# Patient Record
Sex: MALE
[De-identification: names, ages, dates, MRNs, and addresses within clinical notes are randomized per-mention and may not be internally consistent; named-entity substitution may affect disease eponyms.]

---

## 2017-07-10 PROBLEM — Z00.00 ENCOUNTER FOR PREVENTIVE HEALTH EXAMINATION: Status: ACTIVE | Noted: 2017-07-10

## 2023-02-10 ENCOUNTER — APPOINTMENT (OUTPATIENT)
Dept: SURGERY | Facility: CLINIC | Age: 58
End: 2023-02-10
Payer: COMMERCIAL

## 2023-02-10 VITALS
HEART RATE: 73 BPM | SYSTOLIC BLOOD PRESSURE: 118 MMHG | HEIGHT: 67 IN | OXYGEN SATURATION: 94 % | DIASTOLIC BLOOD PRESSURE: 80 MMHG | TEMPERATURE: 97.1 F | WEIGHT: 250 LBS | BODY MASS INDEX: 39.24 KG/M2

## 2023-02-10 PROCEDURE — 99204 OFFICE O/P NEW MOD 45 MIN: CPT

## 2023-02-10 NOTE — ASSESSMENT
[FreeTextEntry1] : 58M h/o hepatic steatosis, esophageal varices, HTN, TALIA, obesity, now presents with possible RLQ hernia. Unfortunately, the patient did not bring his imaging records for review. I asked him to call back with the report or CD for me to review. May need repeat CT A/P.

## 2023-02-10 NOTE — REVIEW OF SYSTEMS
[Fever] : no fever [Chills] : no chills [Eye Pain] : no eye pain [Red Eyes] : eyes not red [Earache] : no earache [Loss Of Hearing] : no hearing loss [Heart Rate Is Slow] : the heart rate was not slow [Heart Rate Is Fast] : the heart rate was not fast [Chest Pain] : no chest pain [Shortness Of Breath] : no shortness of breath [Wheezing] : no wheezing [Cough] : no cough [Abdominal Pain] : no abdominal pain [Vomiting] : no vomiting [Constipation] : no constipation [Dysuria] : no dysuria [Incontinence] : no incontinence [Joint Swelling] : no joint swelling [Joint Stiffness] : no joint stiffness [Skin Lesions] : no skin lesions [Skin Wound] : no skin wound [Confused] : no confusion [Convulsions] : no convulsions [Dizziness] : no dizziness [Fainting] : no fainting [Suicidal] : not suicidal [Sleep Disturbances] : no sleep disturbances [Anxiety] : no anxiety [Proptosis] : no proptosis [Hot Flashes] : no hot flashes [Easy Bleeding] : no tendency for easy bleeding [Easy Bruising] : no tendency for easy bruising

## 2023-02-10 NOTE — PHYSICAL EXAM
[JVD] : no jugular venous distention  [Normal Breath Sounds] : Normal breath sounds [Normal Heart Sounds] : normal heart sounds [Normal Rate and Rhythm] : normal rate and rhythm [Abdominal Masses] : No abdominal masses [Abdomen Tenderness] : ~T ~M No abdominal tenderness [Enlarged] : enlarged [No Rash or Lesion] : No rash or lesion [Alert] : alert [Oriented to Person] : oriented to person [Oriented to Place] : oriented to place [Oriented to Time] : oriented to time [Calm] : calm [de-identified] : well-appearing, NAD [de-identified] : LAURO [de-identified] : soft, NT, ND, unable to palpate any hernia defects due to habitus. [de-identified] : no CVAT [de-identified] : full ROM, no gross deformities

## 2023-02-10 NOTE — HISTORY OF PRESENT ILLNESS
[de-identified] : 58M h/o HTN, severe TALIA, obesity, esophageal varices (no cirrhosis found on biopsy, just steatosis), now presents for evaluation of RLQ pain and possible hernia. Previous open appendectomy many years ago. No pain in groin. During workup for possible cirrhosis, had an MRI, as per patient, that showed possible hernia defect in RLQ and also small "fatty mass" in the L-sided abd subq tissues.\par No changes in diet tolerance or bowel function. No fevers or chills. No tobacco use. No steroid use.

## 2023-07-24 ENCOUNTER — APPOINTMENT (OUTPATIENT)
Dept: FAMILY MEDICINE | Facility: CLINIC | Age: 58
End: 2023-07-24

## 2025-01-06 ENCOUNTER — RX ONLY (RX ONLY)
Age: 60
End: 2025-01-06

## 2025-01-06 ENCOUNTER — OFFICE (OUTPATIENT)
Dept: URBAN - METROPOLITAN AREA CLINIC 113 | Facility: CLINIC | Age: 60
Setting detail: OPHTHALMOLOGY
End: 2025-01-06
Payer: COMMERCIAL

## 2025-01-06 DIAGNOSIS — H01.001: ICD-10-CM

## 2025-01-06 DIAGNOSIS — H43.22: ICD-10-CM

## 2025-01-06 DIAGNOSIS — H02.834: ICD-10-CM

## 2025-01-06 DIAGNOSIS — H02.831: ICD-10-CM

## 2025-01-06 DIAGNOSIS — H01.004: ICD-10-CM

## 2025-01-06 DIAGNOSIS — H25.13: ICD-10-CM

## 2025-01-06 DIAGNOSIS — H16.223: ICD-10-CM

## 2025-01-06 DIAGNOSIS — H43.811: ICD-10-CM

## 2025-01-06 PROCEDURE — 92004 COMPRE OPH EXAM NEW PT 1/>: CPT | Performed by: STUDENT IN AN ORGANIZED HEALTH CARE EDUCATION/TRAINING PROGRAM

## 2025-01-06 ASSESSMENT — REFRACTION_MANIFEST
OD_ADD: +2.50
OD_SPHERE: +1.00
OU_VA: 20/20
OS_ADD: +2.50
OS_SPHERE: +0.25
OD_VA1: 20/20
OS_VA1: 20/20-1

## 2025-01-06 ASSESSMENT — KERATOMETRY
OD_K1POWER_DIOPTERS: 45.50
OD_AXISANGLE_DEGREES: 118
OS_K2POWER_DIOPTERS: 46.50
OD_K2POWER_DIOPTERS: 46.00
OS_K1POWER_DIOPTERS: 46.00
OS_AXISANGLE_DEGREES: 036

## 2025-01-06 ASSESSMENT — SUPERFICIAL PUNCTATE KERATITIS (SPK)
OS_SPK: 2+ 3+
OD_SPK: 1+

## 2025-01-06 ASSESSMENT — REFRACTION_AUTOREFRACTION
OS_SPHERE: +1.00
OD_AXIS: 070
OS_CYLINDER: -0.75
OD_CYLINDER: -0.50
OS_AXIS: 096
OD_SPHERE: +1.25

## 2025-01-06 ASSESSMENT — LID EXAM ASSESSMENTS
OD_BLEPHARITIS: RUL 1+
OS_BLEPHARITIS: LUL 1+

## 2025-01-06 ASSESSMENT — REFRACTION_CURRENTRX
OS_SPHERE: +2.00
OD_OVR_VA: 20/
OS_OVR_VA: 20/
OD_SPHERE: +2.00

## 2025-01-06 ASSESSMENT — TONOMETRY: OS_IOP_MMHG: 20

## 2025-01-06 ASSESSMENT — LID POSITION - DERMATOCHALASIS
OS_DERMATOCHALASIS: LUL 2+
OD_DERMATOCHALASIS: RUL 2+

## 2025-01-06 ASSESSMENT — VISUAL ACUITY
OD_BCVA: 20/20-
OS_BCVA: 20/25+

## 2025-01-06 ASSESSMENT — CONFRONTATIONAL VISUAL FIELD TEST (CVF)
OS_FINDINGS: FULL
OD_FINDINGS: FULL